# Patient Record
(demographics unavailable — no encounter records)

---

## 2017-03-09 DIAGNOSIS — F41.8 MIXED ANXIETY DEPRESSIVE DISORDER: ICD-10-CM

## 2017-03-09 NOTE — TELEPHONE ENCOUNTER
LOV 10/31/2016  Last refill 10/7/2016 #20 tablets  Per  last refill for   Clonazepam 8/8/2016 #20 Chris Sparrow  Clonazepam 8/29/2016 #20 [employer]  Refill telephone encounter 10/5/2016 patient states she has appointment 10/25/2016  See patients comment below

## 2017-03-09 NOTE — TELEPHONE ENCOUNTER
From: Ji Patel  To: Quinn South NP  Sent: 3/9/2017 12:03 PM EST  Subject: Medication Renewal Request    Original authorizing provider: BRIDGER Simmons would like a refill of the following medications:  clonazePAM (KLONOPIN) 0.5 mg tablet [Rick Palacios NP]    Preferred pharmacy: [facility] [id number] AT [address] [place] [address] [address]    Comment:  I have had to space out my medications as I am in between insurance carriers,I start my new job April 1st with [employer], and have had to postpone my 3month checkup with my therapist Dr. Ulysses Gill at [facility] until my health insurance kicks in.

## 2017-03-10 RX ORDER — ESCITALOPRAM OXALATE 20 MG/1
20 TABLET ORAL DAILY
Qty: 90 TAB | Refills: 0 | Status: SHIPPED | OUTPATIENT
Start: 2017-03-10

## 2017-03-10 RX ORDER — CLONAZEPAM 0.5 MG/1
0.5 TABLET ORAL
Qty: 20 TAB | Refills: 0 | OUTPATIENT
Start: 2017-03-10